# Patient Record
Sex: FEMALE | URBAN - METROPOLITAN AREA
[De-identification: names, ages, dates, MRNs, and addresses within clinical notes are randomized per-mention and may not be internally consistent; named-entity substitution may affect disease eponyms.]

---

## 2022-08-02 ENCOUNTER — APPOINTMENT (RX ONLY)
Dept: URBAN - METROPOLITAN AREA CLINIC 114 | Facility: CLINIC | Age: 42
Setting detail: DERMATOLOGY
End: 2022-08-02

## 2022-08-02 VITALS — HEIGHT: 66 IN | WEIGHT: 160 LBS

## 2022-08-02 DIAGNOSIS — L71.8 OTHER ROSACEA: ICD-10-CM

## 2022-08-02 PROCEDURE — ? COUNSELING

## 2022-08-02 PROCEDURE — ? TREATMENT REGIMEN

## 2022-08-02 PROCEDURE — 99204 OFFICE O/P NEW MOD 45 MIN: CPT

## 2022-08-02 PROCEDURE — ? PRESCRIPTION

## 2022-08-02 RX ORDER — IVERMECTIN 10 MG/G
CREAM TOPICAL DAILY
Qty: 45 | Refills: 3 | Status: ERX | COMMUNITY
Start: 2022-08-02

## 2022-08-02 RX ORDER — MINOCYCLINE HYDROCHLORIDE 100 MG/1
CAPSULE ORAL QD
Qty: 90 | Refills: 0 | Status: ERX | COMMUNITY
Start: 2022-08-02

## 2022-08-02 RX ADMIN — MINOCYCLINE HYDROCHLORIDE: 100 CAPSULE ORAL at 00:00

## 2022-08-02 RX ADMIN — IVERMECTIN: 10 CREAM TOPICAL at 00:00

## 2022-08-02 ASSESSMENT — LOCATION DETAILED DESCRIPTION DERM
LOCATION DETAILED: RIGHT MEDIAL MALAR CHEEK
LOCATION DETAILED: LEFT INFERIOR CENTRAL MALAR CHEEK

## 2022-08-02 ASSESSMENT — LOCATION SIMPLE DESCRIPTION DERM
LOCATION SIMPLE: RIGHT CHEEK
LOCATION SIMPLE: LEFT CHEEK

## 2022-08-02 ASSESSMENT — LOCATION ZONE DERM: LOCATION ZONE: FACE

## 2022-08-02 ASSESSMENT — SEVERITY ASSESSMENT OVERALL AMONG ALL PATIENTS
IN YOUR EXPERIENCE, AMONG ALL PATIENTS YOU HAVE SEEN WITH THIS CONDITION, HOW SEVERE IS THIS PATIENT'S CONDITION?: MODERATE

## 2022-08-02 NOTE — PROCEDURE: TREATMENT REGIMEN
Detail Level: Zone
Plan: Discussed with patient triggers
Initiate Treatment: Soolantra 1 % topical cream Daily\\nQuantity: 45.0 g  Days Supply: 90\\nSig: Apply to whole face once daily at night, apply moisturizer cream after.\\n\\nminocycline 100 mg capsule QD\\nQuantity: 90.0 Capsule  Days Supply: 90\\nSig: Take one capsule PO once daily with food and water when flares